# Patient Record
Sex: FEMALE | Race: WHITE | NOT HISPANIC OR LATINO
[De-identification: names, ages, dates, MRNs, and addresses within clinical notes are randomized per-mention and may not be internally consistent; named-entity substitution may affect disease eponyms.]

---

## 2019-11-21 ENCOUNTER — FORM ENCOUNTER (OUTPATIENT)
Age: 49
End: 2019-11-21

## 2020-07-28 ENCOUNTER — FORM ENCOUNTER (OUTPATIENT)
Age: 50
End: 2020-07-28

## 2020-08-04 ENCOUNTER — FORM ENCOUNTER (OUTPATIENT)
Age: 50
End: 2020-08-04

## 2020-08-13 ENCOUNTER — FORM ENCOUNTER (OUTPATIENT)
Age: 50
End: 2020-08-13

## 2020-08-17 ENCOUNTER — FORM ENCOUNTER (OUTPATIENT)
Age: 50
End: 2020-08-17

## 2020-08-19 ENCOUNTER — FORM ENCOUNTER (OUTPATIENT)
Age: 50
End: 2020-08-19

## 2020-08-24 ENCOUNTER — FORM ENCOUNTER (OUTPATIENT)
Age: 50
End: 2020-08-24

## 2020-08-31 ENCOUNTER — FORM ENCOUNTER (OUTPATIENT)
Age: 50
End: 2020-08-31

## 2020-10-06 ENCOUNTER — FORM ENCOUNTER (OUTPATIENT)
Age: 50
End: 2020-10-06

## 2020-10-07 ENCOUNTER — FORM ENCOUNTER (OUTPATIENT)
Age: 50
End: 2020-10-07

## 2020-10-12 ENCOUNTER — FORM ENCOUNTER (OUTPATIENT)
Age: 50
End: 2020-10-12

## 2020-10-12 NOTE — HP
Admitting History and Physical





- Primary Care Physician


PCP: Gerry Rocha





- Admission


Chief Complaint: Right breast DCIS


History of Present Illness: 





50 year old postmneapusal female with family H/O breast cancer genetic test 

negative who had a  mammogram 2020 showing  breast right upper inner aspect a 

partially circumscribed density and has slowly increased since 2016,(1.3 cm 

mass).  Margins appeared irregular and stereotactic core bx was advised which 

was performed 2020. pathology :2020 ADH bordering DCIS ER/KS+. . Breast 

MRI 2020 newly diagnosed right beast cancer left breast negative. 


History Source: Patient


Limitations to Obtaining History: No Limitations





- Past Medical History


Cardiovascular: Yes: HTN, Hyperlipdemia, Other (RBBB)


...LMP Comment: PT HAS AN IUD


Psych: Yes: Anxiety


Additional Past Medical History: 





H/O preeclampsia





- Past Surgical History


Past Surgical History: Yes: 


Additional Past Surgical History: 





bottom lip surgery due to a dog bite





- Smoking History


Smoking history: Former smoker


Have you smoked in the past 12 months: No





- Alcohol/Substance Use


Hx Alcohol Use: Yes (social)





Home Medications





- Allergies


Allergies/Adverse Reactions: 


                                    Allergies











Allergy/AdvReac Type Severity Reaction Status Date / Time


 


Penicillins Allergy   Verified 10/06/20 17:20














- Home Medications


Home Medications: 


Ambulatory Orders





Atorvastatin Ca [Lipitor] 40 mg PO DAILY 10/06/20 


Citalopram Hydrobromide [Citalopram HBr] 20 mg PO DAILY 10/06/20 


Hydrochlorothiazide [Hctz -] 25 mg PO DAILY 10/06/20 


Metoprolol Succinate 50 mg PO DAILY 10/06/20 


Nifedipine ER [Procardia Xl -] 60 mg PO DAILY 10/06/20 











Family Medical History


Family Hx Cancer: Sister (melanom), Brother (melanoma)


Other Family History: mat aunt and GM CRC 70's.  mat GF pancreatic ca 60.  2 

maternal  second cousins breast ca 40's and 68





Physical Examination


Constitutional: Yes: No Distress


Breast(s): Yes: Other (diffusely nodular no palpable masses or adenopathy 

bilaterally post bx changes right breast)





Problem List





- Problems


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


Problems reviewed: Yes   


Code(s): D05.11 - INTRADUCTAL CARCINOMA IN SITU OF RIGHT BREAST   





Assessment/Plan





Right breast wide excision with mammogram needle localization and reduction 

mastopexy by Dr Goldberg

## 2020-10-13 ENCOUNTER — HOSPITAL ENCOUNTER (OUTPATIENT)
Dept: HOSPITAL 74 - FASU | Age: 50
Discharge: HOME | End: 2020-10-13
Attending: SURGERY
Payer: COMMERCIAL

## 2020-10-13 VITALS — DIASTOLIC BLOOD PRESSURE: 74 MMHG | SYSTOLIC BLOOD PRESSURE: 115 MMHG

## 2020-10-13 VITALS — HEART RATE: 92 BPM

## 2020-10-13 VITALS — TEMPERATURE: 98.1 F

## 2020-10-13 VITALS — BODY MASS INDEX: 35 KG/M2

## 2020-10-13 DIAGNOSIS — I45.10: ICD-10-CM

## 2020-10-13 DIAGNOSIS — Z97.5: ICD-10-CM

## 2020-10-13 DIAGNOSIS — I10: ICD-10-CM

## 2020-10-13 DIAGNOSIS — F41.9: ICD-10-CM

## 2020-10-13 DIAGNOSIS — E78.5: ICD-10-CM

## 2020-10-13 DIAGNOSIS — Z88.0: ICD-10-CM

## 2020-10-13 DIAGNOSIS — D05.11: Primary | ICD-10-CM

## 2020-10-13 PROCEDURE — 0HBT0ZZ EXCISION OF RIGHT BREAST, OPEN APPROACH: ICD-10-PCS | Performed by: SURGERY

## 2020-10-13 PROCEDURE — 0HRU07Z REPLACEMENT OF LEFT BREAST WITH AUTOLOGOUS TISSUE SUBSTITUTE, OPEN APPROACH: ICD-10-PCS | Performed by: SURGERY

## 2020-10-13 PROCEDURE — 0HBU0ZZ EXCISION OF LEFT BREAST, OPEN APPROACH: ICD-10-PCS | Performed by: SURGERY

## 2020-10-13 NOTE — XMS
Summarization Of Episode

                           Created on:2020



Patient:ADA MINER

Sex:Female

:1970

External Reference #:[id number]





Demographics







                          Address                   [address]



                                                    [place] [address]

 

                          Home Phone                [phone]

 

                          Work Phone                [phone]

 

                          Preferred Language        English

 

                          Marital Status            Not  or 

 

                          Anabaptism Affiliation     CA

 

                          Race                      WH

 

                          Ethnic Group              Not  or 









Author







                          Organization              [facility]









Support







                Name            Relationship    Address         Phone

 

                [facility] Unavailable     [address] 

[phone]



                [place]                            [place] [address] 

 

                [facility] Unavailable     Unavailable     Unavailable

 

                JASSON MINER JR          [address] [phone]

[phone]



                                                [place] [address] 

 

                UNKNOWN         Unavailable     Unavailable     Unavailable









Care Team Providers







                    Name                Role                Phone

 

                    KADEN CANO          Unavailable         Unavailable

 

                    Orthodox, HUMAYUN      Unavailable         Unavailable

 

                    ROSA ORTEGAVI      Unavailable         Unavailable









Re-disclosure Warning

The records that you are about to access may contain information from federally-
assisted alcohol or drug abuse programs. If such information is present, then 
the following federally mandated warning applies: This information has been 
disclosed to you from records protected by federal confidentiality rules (42 CFR
part 2). The federal rules prohibit you from making any further disclosure of 
this information unless further disclosure is expressly permitted by the written
consent of the person to whom it pertains or as otherwise permitted by 42 CFR 
part 2. A general authorization for the release of medical or other information 
is NOT sufficient for this purpose. The Federal rules restrict any use of the 
information to criminally investigate or prosecute any alcohol or drug abuse 
patient.The records that you are about to access may contain highly sensitive 
health information, the redisclosure of which is protected by Article 27-F of 
the [facility] Public Health law. If you continue you may haveaccess to 
information: Regarding HIV / AIDS; Provided by facilities licensed or operated 
by the [facility] Office of Mental Health; or Provided by the [facility]
Office for People With Developmental Disabilities. If such information is 
present, then the following [place] mandated warning applies: This 
information has been disclosed to you from confidential records which are 
protected by state law. State law prohibits you from making any further 
disclosure of this information without the specific written consent of the 
person to whom it pertains, or as otherwise permitted by law. Any unauthorized 
further disclosure in violation of state law may result in a fine or halfway 
sentence or both. A general authorization for the release of medical or other 
information is NOT sufficient authorization for further disclosure.



Encounters







           Encounter  Providers  Location   Date       Indications Data Source(s

)

 

           Outpatient Attender: RACHAEL,            10/07/2020 Z01.818 C50.211 [facility]



                      GARYAdmitter:            08:44:00 AM            Health Car

gifty ORTEGA,            EDT                   Corporation



                      ZVIReferrer: KADEN CANO                                        









                                        Z01.818 C50.211









           Outpatient Attender: RACHAEL,            10/07/2020 Z01.818    [facility]



                      GARYAdmitter: Orthodox,            08:14:00 AM EDT C50.211 E7

[facility]



                      HUMAYUNReferrer: Jada CANO

rporation



                      KADEN                                        









                                        Z01.818 C50.211 E78.2









           Outpatient                       2020 10:30:00 AM EDT RT STEREO

 BX ( [facility]) EOC 









                                        RT STEREO BX ( [facility]) EOC









           Outpatient                       2020 07:56:00 AM EDT DIAG GRISELDA 

MAMMO RX MOP [facility]



                                                       EOC        









                                        DIAG GRISELDA MAMMO RX MOP EOC









           Outpatient                       2019 11:20:00 AM EST F/U MAMMO

  [facility]









                                        F/U MAMMO







Insurance Providers







          Payer     Policy type Policy ID Covered   Covered party's Policy    Pl

an



          name      / Coverage           party ID  relationship to Castrejon    Inf

ormation



                    type                          castrejon              

 

          BC PPO              [id number]           SP                  [id number]

[id number]

 

          [facility]             [id number]           SP                  [id number]

002



          OUTPT                                                       

 

          BLUE                [id number]           PT                  [id number]

[id number]



          CROSS O                                                   

 

          [place]            [id number]           SP                  [id number]

002



          OF [facility]            [id number]           SP                  [id number]



          OF [place]                                                      



          [place]                                                        







Problems, Conditions, and Diagnoses







           Code       Display Name Description Problem Type Effective  Data Sour

ce(s)



                                                       Dates      

 

           C50.211    Malignant neoplasm MALIG NEOPLM OF Diagnosis  10/07/2020 [place]

[place]



                      of upper-inner UPPER-INNER            08:44:00 AM [facility]

ealt



                      quadrant of right QUADRANT OF RIGHT            EDT        

Care



                      female breast FEMALE BREAST                       Corporat

ion

 

           Z01.818    Encounter for ENCOUNTER FOR Diagnosis  10/07/2020 [facility]



                      other      OTHER                 08:44:00 AM [facility]



                      preprocedural PREPROCEDURAL            EDT        Care



                      examination EXAMINATION                       Corporation

 

           E78.2      Mixed      MIXED      Diagnosis  10/07/2020 [employer]



                      hyperlipidemia HYPERLIPIDEMIA            08:14:00 AM [facility]



                                                       EDT        Care



                                                                  Corporation

 

           N60.01     Solitary cyst of N60.01     Diagnosis  [address]

ains



                      right breast                       10:30:00 AM Hospital



                                                       EDT        

 

           D05.11     Intraductal D05.11     Diagnosis  2020 [place]



                      carcinoma in situ                       10:30:00 AM Hospit

al



                      of right breast                       EDT        

 

           R92.8      Other abnormal and R92.8      Diagnosis  2020 [facility] 

[place]



                      inconclusive                       07:56:00 AM Hospital



                      findings on                       EDT        



                      diagnostic imaging                                  



                      of breast                                   

 

           N63.11     Unspecified lump N63.11     Diagnosis  [address]

ains



                      in the right                       11:20:00 AM Hospital



                      breast, upper                       EST        



                      outer quadrant                                  







Results







                    ID                  Date                Data Source

 

                    [id number]         10/08/2020 03:55:00 PM EDT [facility]











          Name      Value     Range     Interpretation Description Data      Sup

porting



                                        Code                Source(s) Document(s

)

 

          SARS                                              [facility]   



          coronavirus 2                                                   



          RNA                                                         









                                        This lab was ordered by [facility] and reported by [facility].











                                        Procedure

## 2020-10-13 NOTE — OP
DATE OF OPERATION:  10/13/2020

 

PROCEDURE:  Right-sided reconstruction of partial mastectomy defect using breast

reconstruction of local tissues and left sided balancing reduction mammoplasty.  

 

ATTENDING SURGEON:  Neal Goldberg, MD. 

 

FIRST ASSISTANT:  GLORIA Perez 

 

The procedure is performed in combination with a right-sided partial mastectomy

performed by Dr. Gerry Rocha and his team.  That portion of the procedure will be

dictated separately.  

 

DESCRIPTION OF PROCEDURE:  Patient is marked in the holding area.  The reconstructed

breast sited to have a nipple at 23.5 cm from the sternal notch, and the breast

reduction left side is patterned for the same nipple height.  The patient is awake,

aware of all incisions and resulting scars.  2 g Ancef are given preoperatively, and

she is given SANTIAGO hose and sequential compression stockings, brought to the operating

room and placed in supine position.   A Torres catheter is placed after anesthesia is

given.  Position is carefully checked by all surgical and anesthesia and nursing

teams.  Patient is then prepped and draped.  A timeout is called.  Patient,

procedure, incision sites are verified.  The partial mastectomy on the right side is

performed by Dr. Rocha and his team, at which point I am performing the left

contralateral reduction mammoplasty.  This is performed as follows:  The nipple is

traced with a 42-mm cookie cutter.  A 10-cm width inferior pedicle is marked.  With

the breast under tourniquet, the pedicle is deepithelialized with the exception of

the nipple areola.  Skin flaps are then elevated superiorly, laterally, and 

medially, down to the level of the chest wall.  Pedicle is then developed down to the

chest wall, leaving ample blood supply.  Hemostasis is meticulously achieved.  The

glandular tissue between the skin flaps and the pedicle is then removed.  Hemostasis

is achieved again.  Skin is tailor tacked.  Attention is directed toward the right

side after Dr. Rocha is completed with his partial mastectomy.  The partial

mastectomy defect is irrigated, hemostasis is meticulously achieved.  A pedicle is

then developed for the nipple areola, likewise 10 cm inferiorly based.  This is

deepithelialized.  The defect is then connected to skin incisions as a mirror image

to the contralateral side.  Skin flaps are elevated and connected to the mastectomy

defect, and assessment is made of the inferiorly based pedicle as it affects the

vascularity of the nipple-areolar complex on both sides, the nipple-areolar complex

is pink and viable with good bright red bleeding from the surrounding dermal edges

around the nipple.  Additional tissue on the right side is resected in a pattern

similar to the left side.  This resected weight on the right side is 771 g, in

addition to the resected weight of Dr. Rocha's specimen, which I do not have

available.  The resected weight on the left side is a total of 995 g.  Both sides are

tailor tacked, and patient is brought to a seated upright position where there is

excellent symmetry of size, shape, and position.  Both sides are irrigated. 

Hemostasis is meticulously achieved.  The pedicles are pexied to the supermedial

chest wall to provide superior pole fullness and medial fullness.  This is done with

a 2-0 Vicryl suture.  The _____ flat MALCOLM drains are brought out through the lateral

extent of the incision and are secured with a 2-0 silk drain suture.  The inverted T

position is closed with a 2-0 nylon half buried mattress suture.  The remaining of

the inset closure is performed with a series of interrupted buried deep dermal 3-0

Monocryl suture followed by a running subcuticular 3-0 Monocryl suture underneath the

vertical and horizontal limbs.  The nipple areola is inset with a series of

interrupted buried deep dermal 3-0 Monocryl suture followed by a running subcuticular

4-0 Monocryl suture.  All tissues were pink and viable including bilateral nipples. 

Drains are placed to bulb suction.  Patient is dressed with a Steri-Strip, ABD gauze,

surgical breast bra.  Drains placed to bulb suction, she was awoken from anesthesia

neurovascularly intact, and transferred to recovery without complication.  

 

 

NEAL GOLDBERG, M.D.

 

[id number]

DD: 10/13/2020 15:47

DT: 10/13/2020 18:48

Job #:  [id number]

## 2020-10-13 NOTE — OP
DATE OF OPERATION:  10/13/2020

 

PREOPERATIVE DIAGNOSIS:  Right breast ductal carcinoma in situ.

 

POSTOPERATIVE DIAGNOSIS:  Right breast ductal carcinoma in situ.

 

PROCEDURE:  Right mammographically localized partial mastectomy.

 

ANESTHESIA:  General, intubated.

 

ATTENDING SURGEON:  Toshia Rocha MD

 

ASSISTANT:  LEONARD Horn

 

ESTIMATED BLOOD LOSS:  Minimal.

 

COMPLICATIONS:  None.

 

PROCEDURE:  Patient was made aware of the risks and benefits of the procedure 
and

consented.  Preoperatively she went to the radiology suite where needle and wire
were

placed next to the index lesion.  Patient was then placed in the supine position
on

the operating room table and after general anesthesia was induced the patient 
was

intubated.  Since this procedure is in concert with a bilateral reduction 
plastic

surgery, the reduction incisions were lined out preoperatively.  The operative 
site

was prepped and draped in the usual sterile fashion.  In the right breast the

reduction pattern incision was made allowing us to get exposure to the wire and

surgical area.  Thick skin flaps were made by electrocautery.  The needle was

withdrawn through the puncture site and the wire through the wound.  Tissues 
around

the wire were then sharply excised and submitted with a short suture superior, 
long

suture lateral.  This was submitted for specimen radiograph which confirmed the

presence of the index lesion.  The specimen was then submitted for permanent

sectioning.  Additional segments were taken deep anterior, medial, lateral, 
superior

and inferior with clips at the new margin.  The wound was copiously irrigated 
with

normal saline.  Hemostasis was maintained by electrocautery.  The procedure was 
then turned over to Dr. Goldberg for breast reduction and he'll dictate his 
portion of the procedure separately.

 

 

TOSHIA ROCHA M.D.

 

[id number]

DD: 10/13/2020 15:25

DT: 10/13/2020 16:19

Job #:  [id number]

MTDD

## 2020-10-16 NOTE — PATH
Surgical Pathology Report



Patient Name:  ADA MINER

Accession #:  [id number]

[facility]. Rec. #:  [id number]                                                        

   /Age/Gender:  1970 (Age: 50) / F

Account:  [id number]                                                          

             Location: [facility] AMBULATORY 

Taken:  10/13/2020

Received:  10/13/2020

Reported:  10/16/2020

Physicians:  Gerry Rocha M.D.

  



Specimen(s) Received

A: RIGHT BREAST WIDE EXCISION 

B: RIGHT BREAST ANTERIOR MARGIN 

C: RIGHT BREAST DEEP MARGIN 

D: RIGHT BREAST MEDIAL MARGIN 

E: RIGHT BREAST INFERIOR MARGIN 

F: RIGHT BREAST LATERAL MARGIN 

G: RIGHT BREAST SUPERIOR MARGIN 

H: LEFT BREAST SKIN AND TISSUE 

I: RIGHT BREAST SKIN AND TISSUE 





Clinical History

DCIS







Final Diagnosis

A. breast, right, wide excision:

FOCAL Ductal carcinoma in situ (DCIS), Solid, cribriform and papillary type, low

nuclear grade with focal necrosis and few associated calcifications.

DCIS is present in one of ten slides (1/10).

Surgical margins are uninvolved by DCIS; DCIS is at 9 mm FROM the closest

(medial) margin.

see specimens b-g for final margins.

Remaining breast tissue shows cystic apocrine metaplasia, usual ductal

hyperplasia (uDH) and columnar cell change with associated calcifications.

Prior biopsy site changes are present.

Pathologic stage (pTNM): pTis (DCIS) pnx.

SEE Also DCIS case summary below.



Comment: See also report of right breast stereotactic biopsy from outside

institution ([id number]/our review case # [id number]).



B. breast, right, anterior margin, excision:

Benign breast tissue.



C. breast, right, deep margin, excision:

Benign fibroadipose tissue and skeletal muscle.



D. breast, right, medial margin, excision:

Breast tissue showing focal atypical ductal hyperplasia (adH).



E. breast, right, inferior margin, excision: 

Benign breast tissue.



F. breast, right, lateral margin, excision:

Benign breast tissue.



G. breast, right, superior margin, excision:

Benign breast tissue.



H. skin and tissue, left breast, excision:

Breast tissue showing focal atypical ductal hyperplasia (adh), focal FLAT

epithelial atypia (fea), columnar cell change, cystic apocrine metaplasia, usual

ductal hyperplasia (UDH), small fibroadenoma AND associated calcifications.

One benign intramammary lymph node.

Skin with no pathologic findings.



I. skin and tissue, right breast, excision:

Benign breast tissue showing small fibroadenoma, cystic apocrine metaplasia,

usual ductal hyperplasia (UDH) AND associated calcifications.

Skin with no pathologic findings.



Comments

DCIS of the Breast: Surgical Pathology Cancer Case Summary 

(Based on AJCC TNM 8 th edition)



Procedure 

_X_ Excision (less than total mastectomy)



Specimen Laterality

_X_ Right



Size (Extent) of DCIS 

Estimated size (extent) of DCIS (greatest dimension using gross and microscopic

evaluation): at least 2 mm

Number of blocks with DCIS: 1

Number of blocks examined: 34 (based on specimens A-G,I)

Note: The size (extent) of DCIS is an estimation of the volume of breast tissue

occupied by DCIS.



Histologic Type 

_X_ Ductal carcinoma in situ

 

Architectural Patterns 

 _X_ Cribriform

 _X_ Papillary

 _X_ Solid

 

Nuclear Grade 

_X_ Grade I (low)



Necrosis 

_X_ Present, focal (small foci or single cell necrosis)



Margins 

_X_ Uninvolved by DCIS

     Distance from closest margin (millimeters): 9 mm from medial margin in wide

excision A; final medial margin D is negative for DCIS



Regional Lymph Nodes 

_X_ No lymph nodes submitted or found



Pathologic Stage Classification (pTNM, AJCC 8th Edition) 

Primary Tumor (pT) 

_X_ pTis (DCIS):     Ductal carcinoma in situ



Regional Lymph Nodes (pN) 

     _X_ pNx

          

Microcalcifications 

_X_ Present in DCIS

_X_ Present in non-neoplastic tissue



Biomarker Studies

Results of ER and HI studies performed on this specimen (block A6) at [facility] are as follows:

ER (clone [id number] mouse monoclonal antibody by [employer]): 

_X_ Positive: > 95 % nuclear staining with strong intensity



PgR (clone16 mouse monoclonal antibody by [employer]):

_X_ Positive: ~60 % nuclear staining with moderate to strong intensity



Positive and negative controls (internal if applicable) show appropriate

results.

Formalin fixation and cold ischemic times are within current ASCO/CAP

recommendations for ER, HI and Her2 testing.



***Electronically Signed***

Stacy Prieto M.D.





Gross Description

A.  Received in formalin, labeled "right breast wide excision," is a 6.5 x 3.8 x

3.7 cm. tan-yellow, irregular, portion of fibroadipose tissue with a needle

localization wire present. There is a short suture marking the superior aspect

and a long suture marking the lateral aspect, per the surgeon. There is no skin

or nipple present. The specimen is inked as follows: superior and lateral blue;

inferior green; medial yellow; anterior red; deep black. The specimen is

serially sectioned from superior to inferior. Sectioning reveals foci of white

fibrous tissue. No definitive mass is identified. Representative sections are

submitted in 10 cassettes as follows: 1-7-fibrous tissue sequentially submitted

from superior to inferior (1-lateral and deep margins; 2-4-deep margin;

6-2-kxuerkbp and medial margins); 8-additional lateral margin; 9-superior

margin; 10-inferior margin.

Time to formalin fixation: 26 minutes

Total formalin fixation time: Approximately 30 hours.



B. Received in formalin labeled "right breast anterior margin," is a 2.8 x 2.5 x

0.9 cm portion of fibroadipose tissue with a clip marking the new margin, per

the surgeon. The new margin is inked blue and the specimen is serially

sectioned. The specimen is entirely submitted in 3 cassettes.



C. Received in formalin labeled "right breast deep margin," is a 2.3 x 1.8 x 1.0

cm portion of fibroadipose tissue with a clip marking the new margin, per the

surgeon. The new margin is inked blue and the specimen is serially sectioned.

The specimen is entirely submitted in 3 cassettes.



D. Received in formalin labeled "right breast medial margin," is a 2.3 x 1.3 x

0.5 cm portion of fibroadipose tissue with a clip marking the new margin, per

the surgeon. The new margin is inked blue and the specimen is serially

sectioned. The specimen is entirely submitted in 2 cassettes.



E. Received in formalin labeled "right breast inferior margin," is a 2.7 x 1.5 x

1.0 cm portion of fibroadipose tissue with a clip marking the new margin, per

the surgeon. The new margin is inked blue and the specimen is serially

sectioned. The specimen is entirely submitted in 3 cassettes.



F. Received in formalin labeled "right breast lateral margin," is a 2.3 x 1.4 x

0.7 cm portion of fibroadipose tissue with a clip marking the new margin, per

the surgeon. The new margin is inked blue and the specimen is serially

sectioned. The specimen is entirely submitted in 2 cassettes.



G. Received in formalin labeled "right breast superior margin," is a 2.8 x 1.8 x

0.9 cm portion of fibroadipose tissue with a clip marking the new margin, per

the surgeon. The new margin is inked blue and the specimen is serially

sectioned. The specimen is entirely submitted in 3 cassettes.



H. Received in formalin labeled "left breast skin and tissue," is a 1059 g, 25.0

x 20.0 x 5.5 cm aggregate of abundant tan-yellow, unoriented portions of

fibroadipose tissue and tan, unremarkable skin. Sectioning reveals multifocal

white fibrous tissue as well as a 0.6 cm greatest dimension possible lymph node.

No discrete masses are identified. Representative sections are submitted in 14

cassettes with one bisected possible lymph node in cassette #1. 



I. Received in formalin labeled "right breast skin and tissue," is an 803 g,

22.0 x 16.5 x 5.0 cm aggregate of multiple tan-yellow, unoriented portions of

fibroadipose tissue and tan, unremarkable skin. Sectioning reveals multiple foci

of white fibrous tissue. No discrete masses are identified. Representative

sections are submitted in 8 cassettes.



/10/14/2020



[facility]/10/14/2020

## 2020-10-19 ENCOUNTER — FORM ENCOUNTER (OUTPATIENT)
Age: 50
End: 2020-10-19

## 2021-01-21 PROBLEM — Z00.00 ENCOUNTER FOR PREVENTIVE HEALTH EXAMINATION: Status: ACTIVE | Noted: 2021-01-21

## 2021-01-26 ENCOUNTER — APPOINTMENT (OUTPATIENT)
Dept: BREAST CENTER | Facility: CLINIC | Age: 51
End: 2021-01-26
Payer: COMMERCIAL

## 2021-01-26 ENCOUNTER — APPOINTMENT (OUTPATIENT)
Dept: BREAST CENTER | Facility: CLINIC | Age: 51
End: 2021-01-26

## 2021-01-26 VITALS
SYSTOLIC BLOOD PRESSURE: 128 MMHG | DIASTOLIC BLOOD PRESSURE: 88 MMHG | BODY MASS INDEX: 35.08 KG/M2 | HEIGHT: 63 IN | WEIGHT: 198 LBS

## 2021-01-26 DIAGNOSIS — I45.4 NONSPECIFIC INTRAVENTRICULAR BLOCK: ICD-10-CM

## 2021-01-26 DIAGNOSIS — I10 ESSENTIAL (PRIMARY) HYPERTENSION: ICD-10-CM

## 2021-01-26 DIAGNOSIS — Z80.0 FAMILY HISTORY OF MALIGNANT NEOPLASM OF DIGESTIVE ORGANS: ICD-10-CM

## 2021-01-26 DIAGNOSIS — Z80.3 FAMILY HISTORY OF MALIGNANT NEOPLASM OF BREAST: ICD-10-CM

## 2021-01-26 DIAGNOSIS — N60.81 OTHER BENIGN MAMMARY DYSPLASIAS OF RIGHT BREAST: ICD-10-CM

## 2021-01-26 DIAGNOSIS — Z87.59 PERSONAL HISTORY OF OTHER COMPLICATIONS OF PREGNANCY, CHILDBIRTH AND THE PUERPERIUM: ICD-10-CM

## 2021-01-26 DIAGNOSIS — E78.00 PURE HYPERCHOLESTEROLEMIA, UNSPECIFIED: ICD-10-CM

## 2021-01-26 DIAGNOSIS — Z78.9 OTHER SPECIFIED HEALTH STATUS: ICD-10-CM

## 2021-01-26 DIAGNOSIS — Z80.8 FAMILY HISTORY OF MALIGNANT NEOPLASM OF OTHER ORGANS OR SYSTEMS: ICD-10-CM

## 2021-01-26 PROCEDURE — 99072 ADDL SUPL MATRL&STAF TM PHE: CPT

## 2021-01-26 PROCEDURE — 99214 OFFICE O/P EST MOD 30 MIN: CPT

## 2021-01-26 RX ORDER — NIFEDIPINE 20 MG/1
CAPSULE ORAL
Refills: 0 | Status: ACTIVE | COMMUNITY

## 2021-01-26 RX ORDER — METOPROLOL TARTRATE 75 MG/1
TABLET, FILM COATED ORAL
Refills: 0 | Status: ACTIVE | COMMUNITY

## 2021-01-26 RX ORDER — ATORVASTATIN CALCIUM 80 MG/1
TABLET, FILM COATED ORAL
Refills: 0 | Status: ACTIVE | COMMUNITY

## 2021-01-26 RX ORDER — HYDROCHLOROTHIAZIDE 12.5 MG/1
TABLET ORAL
Refills: 0 | Status: ACTIVE | COMMUNITY

## 2021-01-26 RX ORDER — TAMOXIFEN CITRATE 20 MG/1
20 TABLET, FILM COATED ORAL
Refills: 0 | Status: ACTIVE | COMMUNITY

## 2021-01-26 RX ORDER — CITALOPRAM HYDROBROMIDE 10 MG/1
TABLET, FILM COATED ORAL
Refills: 0 | Status: ACTIVE | COMMUNITY

## 2021-01-26 NOTE — PHYSICAL EXAM
[No Supraclavicular Adenopathy] : no supraclavicular adenopathy [No Cervical Adenopathy] : no cervical adenopathy [Clear to Auscultation Bilat] : clear to auscultation bilaterally [Examined in the supine and seated position] : examined in the supine and seated position [Symmetrical] : symmetrical [No dominant masses] : no dominant masses in right breast  [No dominant masses] : no dominant masses left breast [No Nipple Retraction] : no left nipple retraction [No Nipple Discharge] : no left nipple discharge [Breast Abnormal Lactation (Galactorrhea)] : no galactorrhea [Breast Abnormal Secretion Bloody Fluid] : no bloody discharge [Breast Abnormal Secretion Serous Fluid] : no serous discharge [Breast Abnormal Secretion Opalescent Fluid] : no milky discharge [No Axillary Lymphadenopathy] : no left axillary lymphadenopathy [Soft] : abdomen soft [Not Tender] : non-tender [No Hepato-Splenomegaly] : no hepato-splenomegaly [No Rashes] : no rashes [de-identified] : Well-healed bilateral reduction scars

## 2021-01-26 NOTE — HISTORY OF PRESENT ILLNESS
[FreeTextEntry1] : 10/20 right partial mastectomy with breast reduction for focal ER/WI positive DCIS\par Tamoxifen\par Family history of breast cancer\par Mendoza genetic tested negative\par \par Patient denies any breast masses or bone pain.  Patient saw medical oncologist to put her on tamoxifen and her radiation oncologist, Thai Jeffers, who did not recommend any breast radiation.  Patient taking and tolerating tamoxifen and will be seeing her gynecologist.\par \par In April 2019 patient had a mammogram which showed a right breast nodule but on follow-up views and increased in size.  Stereotactic biopsy revealed ADH bordering on DCIS that was ER/WI positive.  Patient was asymptomatic at that time but was high risk for 2 maternal cousins with breast cancer in their 40s and 60s as well as pancreatic cancer and melanoma on the mother side.  This has been her first breast biopsy and she had never taken hormone replacement therapy.  She gene tested negative with Mendoza.

## 2021-01-26 NOTE — REVIEW OF SYSTEMS
[As Noted in HPI] : as noted in HPI [Anxiety] : anxiety [Negative] : Heme/Lymph [FreeTextEntry7] : Indigestion [FreeTextEntry2] : Itching skin [de-identified] : Stressed

## 2021-01-26 NOTE — PAST MEDICAL HISTORY
[Menarche Age ____] : age at menarche was [unfilled] [Total Preg ___] : G[unfilled] [Live Births ___] : P[unfilled]  [Age At Live Birth ___] : Age at live birth: [unfilled] [History of Hormone Replacement Treatment] : has no history of hormone replacement treatment

## 2021-02-16 ENCOUNTER — RESULT REVIEW (OUTPATIENT)
Age: 51
End: 2021-02-16

## 2021-02-17 ENCOUNTER — NON-APPOINTMENT (OUTPATIENT)
Age: 51
End: 2021-02-17

## 2021-04-06 ENCOUNTER — NON-APPOINTMENT (OUTPATIENT)
Age: 51
End: 2021-04-06

## 2021-04-06 ENCOUNTER — APPOINTMENT (OUTPATIENT)
Dept: BREAST CENTER | Facility: CLINIC | Age: 51
End: 2021-04-06
Payer: COMMERCIAL

## 2021-04-06 PROCEDURE — 99213 OFFICE O/P EST LOW 20 MIN: CPT | Mod: 25

## 2021-04-06 PROCEDURE — 99072 ADDL SUPL MATRL&STAF TM PHE: CPT

## 2021-04-06 PROCEDURE — 10060 I&D ABSCESS SIMPLE/SINGLE: CPT

## 2021-04-06 NOTE — HISTORY OF PRESENT ILLNESS
[FreeTextEntry1] : 10/20 right partial mastectomy with breast reduction for focal ER/CT positive ductal carcinoma in situ\par Tamoxifen\par Family history of breast cancer\par Ambry gene tested negative\par \par Patient comes in early complaining of a sudden development of a tender right axillary skin nodule.  Patient denies any fever or chills.\par \par In April 2019 patient had a mammogram which showed a right breast nodule but on follow-up views had increased in size.  Stereotactic biopsy revealed ADH bordering on DCIS that was ER/CT positive.  Patient was asymptomatic at that time but was high risk with 2 maternal cousins with breast cancer in their 40s and 60s as well as pancreatic cancer and melanoma on the mother's side.  This has been her first breast biopsy and she has never had hormone replacement therapy.  She gene tested negative with Ambry.

## 2021-04-06 NOTE — PROCEDURE
[FreeTextEntry1] : Incision and drainage of right axillary sebaceous cyst abscess. [FreeTextEntry3] : Under sterile conditions with an 11 blade the right axillary sebaceous cyst was incised releasing purulent fluid and sebaceous material.  Well-tolerated by patient.  Sterile dressing applied.

## 2021-04-06 NOTE — PHYSICAL EXAM
[de-identified] : In the right axilla is a 20 x 8 mm erythematous infected sebaceous cyst without drainage.

## 2021-04-06 NOTE — REASON FOR VISIT
[Procedure: _________] : a [unfilled] procedure visit [FreeTextEntry1] : Consultation for right axillary tender nodule

## 2021-04-27 ENCOUNTER — APPOINTMENT (OUTPATIENT)
Dept: BREAST CENTER | Facility: CLINIC | Age: 51
End: 2021-04-27
Payer: COMMERCIAL

## 2021-04-27 VITALS
HEIGHT: 63 IN | HEART RATE: 75 BPM | SYSTOLIC BLOOD PRESSURE: 127 MMHG | BODY MASS INDEX: 35.08 KG/M2 | WEIGHT: 198 LBS | DIASTOLIC BLOOD PRESSURE: 87 MMHG

## 2021-04-27 DIAGNOSIS — N63.10 UNSPECIFIED LUMP IN THE RIGHT BREAST, UNSPECIFIED QUADRANT: ICD-10-CM

## 2021-04-27 PROCEDURE — 99212 OFFICE O/P EST SF 10 MIN: CPT

## 2021-04-27 PROCEDURE — 99072 ADDL SUPL MATRL&STAF TM PHE: CPT

## 2021-04-28 PROBLEM — N63.10 BREAST MASS, RIGHT: Status: ACTIVE | Noted: 2021-04-28
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
## 2021-04-28 NOTE — PHYSICAL EXAM
[No Supraclavicular Adenopathy] : no supraclavicular adenopathy [No Cervical Adenopathy] : no cervical adenopathy [Examined in the supine and seated position] : examined in the supine and seated position [Symmetrical] : symmetrical [No dominant masses] : no dominant masses left breast [No Nipple Retraction] : no left nipple retraction [No Nipple Discharge] : no left nipple discharge [de-identified] : At the 3 o'clock position underneath the areolar incision from her reduction is an 8 mm area of thickening with overlying mild erythema.

## 2021-07-28 ENCOUNTER — APPOINTMENT (OUTPATIENT)
Dept: BREAST CENTER | Facility: CLINIC | Age: 51
End: 2021-07-28
Payer: COMMERCIAL

## 2021-07-28 VITALS
BODY MASS INDEX: 35.08 KG/M2 | HEIGHT: 63 IN | WEIGHT: 198 LBS | HEART RATE: 75 BPM | DIASTOLIC BLOOD PRESSURE: 90 MMHG | SYSTOLIC BLOOD PRESSURE: 133 MMHG

## 2021-07-28 DIAGNOSIS — R92.2 INCONCLUSIVE MAMMOGRAM: ICD-10-CM

## 2021-07-28 PROCEDURE — 99213 OFFICE O/P EST LOW 20 MIN: CPT

## 2021-07-28 NOTE — HISTORY OF PRESENT ILLNESS
[FreeTextEntry1] : 10/20 right partial mastectomy with breast reduction\par ER/CO positive ductal carcinoma in situ\par Tamoxifen\par Ambry gene tested negative\par Family history of breast cancer\par \par She denies any breast masses or bone pain.\par \par Patient has never had hormone replacement therapy

## 2021-07-28 NOTE — REVIEW OF SYSTEMS
None [Negative] : Heme/Lymph [facility]  [facility]ediatric[facility]  [address] [place] [id number]  Phone: [phone]  Fax: [phone]  Follow Up Time: 1-3 days

## 2021-07-28 NOTE — PHYSICAL EXAM
[No Supraclavicular Adenopathy] : no supraclavicular adenopathy [No Cervical Adenopathy] : no cervical adenopathy [Clear to Auscultation Bilat] : clear to auscultation bilaterally [Examined in the supine and seated position] : examined in the supine and seated position [Symmetrical] : symmetrical [No dominant masses] : no dominant masses in right breast  [No dominant masses] : no dominant masses left breast [No Nipple Retraction] : no left nipple retraction [No Nipple Discharge] : no left nipple discharge [No Axillary Lymphadenopathy] : no left axillary lymphadenopathy [No Rashes] : no rashes

## 2022-02-28 ENCOUNTER — RESULT REVIEW (OUTPATIENT)
Age: 52
End: 2022-02-28

## 2022-02-28 ENCOUNTER — APPOINTMENT (OUTPATIENT)
Dept: BREAST CENTER | Facility: CLINIC | Age: 52
End: 2022-02-28
Payer: COMMERCIAL

## 2022-02-28 VITALS
SYSTOLIC BLOOD PRESSURE: 130 MMHG | HEIGHT: 63 IN | HEART RATE: 89 BPM | BODY MASS INDEX: 38.09 KG/M2 | WEIGHT: 215 LBS | DIASTOLIC BLOOD PRESSURE: 86 MMHG

## 2022-02-28 DIAGNOSIS — L72.3 SEBACEOUS CYST: ICD-10-CM

## 2022-02-28 DIAGNOSIS — Z87.891 PERSONAL HISTORY OF NICOTINE DEPENDENCE: ICD-10-CM

## 2022-02-28 PROCEDURE — 99213 OFFICE O/P EST LOW 20 MIN: CPT

## 2022-02-28 NOTE — PHYSICAL EXAM
[No Supraclavicular Adenopathy] : no supraclavicular adenopathy [No Cervical Adenopathy] : no cervical adenopathy [Clear to Auscultation Bilat] : clear to auscultation bilaterally [Examined in the supine and seated position] : examined in the supine and seated position [Symmetrical] : symmetrical [No dominant masses] : no dominant masses in right breast  [No dominant masses] : no dominant masses left breast [No Nipple Retraction] : no left nipple retraction [No Nipple Discharge] : no left nipple discharge [No Axillary Lymphadenopathy] : no left axillary lymphadenopathy [No Rashes] : no rashes [de-identified] : There is a 1.5 cm superficial area of erythema with underlying induration but no fluid collection or drainage.

## 2022-02-28 NOTE — HISTORY OF PRESENT ILLNESS
[FreeTextEntry1] : 10/20 right partial mastectomy with breast reduction\par ER/FL positive ductal carcinoma in situ\par Tamoxifen\par Ambry gene tested negative\par Family history of breast cancer\par \par She denies any breast masses or bone pain.  Recent mammogram and ultrasound was unremarkable.  Patient is complaining of a left axillary tender skin cyst but has no fever or chills.\par \par Patient has never had hormone replacement therapy

## 2022-08-09 ENCOUNTER — APPOINTMENT (OUTPATIENT)
Dept: BREAST CENTER | Facility: CLINIC | Age: 52
End: 2022-08-09

## 2022-08-09 VITALS
BODY MASS INDEX: 32.96 KG/M2 | SYSTOLIC BLOOD PRESSURE: 139 MMHG | HEIGHT: 63 IN | HEART RATE: 107 BPM | OXYGEN SATURATION: 99 % | WEIGHT: 186 LBS | DIASTOLIC BLOOD PRESSURE: 98 MMHG

## 2022-08-09 PROCEDURE — 99213 OFFICE O/P EST LOW 20 MIN: CPT

## 2022-08-09 NOTE — HISTORY OF PRESENT ILLNESS
[FreeTextEntry1] : 10/20 right partial mastectomy with breast reduction\par ER/AZ positive ductal carcinoma in situ\par Tamoxifen\par Ambry gene tested negative\par Family history of breast cancer\par \par She denies any breast masses or bone pain.    Patient's left axillary tender skin cyst has not changed without fever or chills.  Taking and tolerating her tamoxifen and seeing her gynecologist annually.\par \par Patient has never had hormone replacement therapy

## 2023-03-08 ENCOUNTER — RESULT REVIEW (OUTPATIENT)
Age: 53
End: 2023-03-08

## 2023-03-08 ENCOUNTER — APPOINTMENT (OUTPATIENT)
Dept: BREAST CENTER | Facility: CLINIC | Age: 53
End: 2023-03-08
Payer: COMMERCIAL

## 2023-03-08 VITALS
DIASTOLIC BLOOD PRESSURE: 92 MMHG | WEIGHT: 176 LBS | HEART RATE: 87 BPM | HEIGHT: 63 IN | BODY MASS INDEX: 31.18 KG/M2 | OXYGEN SATURATION: 99 % | SYSTOLIC BLOOD PRESSURE: 126 MMHG

## 2023-03-08 DIAGNOSIS — N60.12 DIFFUSE CYSTIC MASTOPATHY OF LEFT BREAST: ICD-10-CM

## 2023-03-08 DIAGNOSIS — N60.11 DIFFUSE CYSTIC MASTOPATHY OF LEFT BREAST: ICD-10-CM

## 2023-03-08 PROCEDURE — 99213 OFFICE O/P EST LOW 20 MIN: CPT

## 2023-03-08 NOTE — HISTORY OF PRESENT ILLNESS
[FreeTextEntry1] : 10/20 right partial mastectomy with breast reduction\par ER/WV positive ductal carcinoma in situ\par Tamoxifen\par Ambry gene tested negative\par Family history of breast cancer\par \par She denies any breast masses or bone pain.   Recent mammogram and ultrasound was unremarkable, BI-RADS 2.  Taking and tolerating her tamoxifen and seeing her gynecologist annually.\par \par Patient has never had hormone replacement therapy

## 2023-09-11 ENCOUNTER — NON-APPOINTMENT (OUTPATIENT)
Age: 53
End: 2023-09-11

## 2023-09-12 ENCOUNTER — APPOINTMENT (OUTPATIENT)
Dept: BREAST CENTER | Facility: CLINIC | Age: 53
End: 2023-09-12
Payer: COMMERCIAL

## 2023-09-12 VITALS
SYSTOLIC BLOOD PRESSURE: 132 MMHG | HEART RATE: 98 BPM | WEIGHT: 187 LBS | BODY MASS INDEX: 33.13 KG/M2 | OXYGEN SATURATION: 97 % | HEIGHT: 63 IN | DIASTOLIC BLOOD PRESSURE: 99 MMHG

## 2023-09-12 PROCEDURE — 99213 OFFICE O/P EST LOW 20 MIN: CPT

## 2024-03-13 ENCOUNTER — RESULT REVIEW (OUTPATIENT)
Age: 54
End: 2024-03-13

## 2024-03-13 ENCOUNTER — APPOINTMENT (OUTPATIENT)
Dept: BREAST CENTER | Facility: CLINIC | Age: 54
End: 2024-03-13
Payer: COMMERCIAL

## 2024-03-13 VITALS
HEIGHT: 63 IN | SYSTOLIC BLOOD PRESSURE: 119 MMHG | HEART RATE: 94 BPM | DIASTOLIC BLOOD PRESSURE: 90 MMHG | BODY MASS INDEX: 33.31 KG/M2 | WEIGHT: 188 LBS | OXYGEN SATURATION: 97 %

## 2024-03-13 DIAGNOSIS — Z80.3 FAMILY HISTORY OF MALIGNANT NEOPLASM OF BREAST: ICD-10-CM

## 2024-03-13 DIAGNOSIS — Z90.11 ACQUIRED ABSENCE OF RIGHT BREAST AND NIPPLE: ICD-10-CM

## 2024-03-13 DIAGNOSIS — D05.11 INTRADUCTAL CARCINOMA IN SITU OF RIGHT BREAST: ICD-10-CM

## 2024-03-13 PROCEDURE — 99213 OFFICE O/P EST LOW 20 MIN: CPT

## 2024-03-15 PROBLEM — Z80.3 FAMILY HISTORY OF MALIGNANT NEOPLASM OF BREAST: Status: ACTIVE | Noted: 2021-01-26

## 2024-03-15 PROBLEM — Z90.11 HISTORY OF PARTIAL MASTECTOMY OF RIGHT BREAST: Status: ACTIVE | Noted: 2022-02-28

## 2024-03-15 PROBLEM — D05.11 DUCTAL CARCINOMA IN SITU (DCIS) OF RIGHT BREAST: Status: ACTIVE | Noted: 2021-01-26

## 2024-03-15 NOTE — HISTORY OF PRESENT ILLNESS
[FreeTextEntry1] : 10/20 right partial mastectomy with breast reduction ER/WA positive ductal carcinoma in situ [id number], clinical stage 0 Tamoxifen  Ambry gene tested negative Family history of breast cancer  She denies any breast masses or bone pain.    Recent mammogram and ultrasound was unremarkable, BI-RADS 2.  Taking and tolerating her tamoxifen and seeing her gynecologist annually.  Patient has never had hormone replacement therapy

## 2024-09-11 ENCOUNTER — APPOINTMENT (OUTPATIENT)
Dept: BREAST CENTER | Facility: CLINIC | Age: 54
End: 2024-09-11
Payer: COMMERCIAL

## 2024-09-11 VITALS
SYSTOLIC BLOOD PRESSURE: 122 MMHG | OXYGEN SATURATION: 97 % | HEIGHT: 63 IN | HEART RATE: 85 BPM | WEIGHT: 205 LBS | DIASTOLIC BLOOD PRESSURE: 85 MMHG | BODY MASS INDEX: 36.32 KG/M2

## 2024-09-11 DIAGNOSIS — Z90.11 ACQUIRED ABSENCE OF RIGHT BREAST AND NIPPLE: ICD-10-CM

## 2024-09-11 DIAGNOSIS — Z80.3 FAMILY HISTORY OF MALIGNANT NEOPLASM OF BREAST: ICD-10-CM

## 2024-09-11 DIAGNOSIS — Z86.000 PERSONAL HISTORY OF IN-SITU NEOPLASM OF BREAST: ICD-10-CM

## 2024-09-11 PROCEDURE — 99213 OFFICE O/P EST LOW 20 MIN: CPT

## 2024-09-11 NOTE — HISTORY OF PRESENT ILLNESS
[FreeTextEntry1] : 10/20 right partial mastectomy with breast reduction ER/ND positive ductal carcinoma in situ [id number], clinical stage 0 Tamoxifen  Ambry gene tested negative Family history of breast cancer  She denies any breast masses or bone pain.  Taking and tolerating her tamoxifen and seeing her gynecologist annually.  Patient has never had hormone replacement therapy

## 2024-10-01 NOTE — PHYSICAL EXAM
[No Supraclavicular Adenopathy] : no supraclavicular adenopathy [No Cervical Adenopathy] : no cervical adenopathy 37.1 [Clear to Auscultation Bilat] : clear to auscultation bilaterally [Examined in the supine and seated position] : examined in the supine and seated position [Symmetrical] : symmetrical [No dominant masses] : no dominant masses in right breast  [No dominant masses] : no dominant masses left breast [No Nipple Retraction] : no left nipple retraction [No Nipple Discharge] : no left nipple discharge [No Axillary Lymphadenopathy] : no left axillary lymphadenopathy [No Rashes] : no rashes

## 2025-03-26 ENCOUNTER — RESULT REVIEW (OUTPATIENT)
Age: 55
End: 2025-03-26

## 2025-03-26 ENCOUNTER — APPOINTMENT (OUTPATIENT)
Dept: BREAST CENTER | Facility: CLINIC | Age: 55
End: 2025-03-26
Payer: COMMERCIAL

## 2025-03-26 VITALS
OXYGEN SATURATION: 96 % | WEIGHT: 216 LBS | HEART RATE: 76 BPM | BODY MASS INDEX: 38.27 KG/M2 | SYSTOLIC BLOOD PRESSURE: 128 MMHG | DIASTOLIC BLOOD PRESSURE: 89 MMHG | HEIGHT: 63 IN

## 2025-03-26 DIAGNOSIS — R92.30 INCONCLUSIVE MAMMOGRAM: ICD-10-CM

## 2025-03-26 DIAGNOSIS — Z90.11 ACQUIRED ABSENCE OF RIGHT BREAST AND NIPPLE: ICD-10-CM

## 2025-03-26 DIAGNOSIS — Z86.000 PERSONAL HISTORY OF IN-SITU NEOPLASM OF BREAST: ICD-10-CM

## 2025-03-26 DIAGNOSIS — R92.2 INCONCLUSIVE MAMMOGRAM: ICD-10-CM

## 2025-03-26 DIAGNOSIS — Z80.3 FAMILY HISTORY OF MALIGNANT NEOPLASM OF BREAST: ICD-10-CM

## 2025-03-26 DIAGNOSIS — N60.81 OTHER BENIGN MAMMARY DYSPLASIAS OF RIGHT BREAST: ICD-10-CM

## 2025-03-26 PROCEDURE — 99213 OFFICE O/P EST LOW 20 MIN: CPT
